# Patient Record
(demographics unavailable — no encounter records)

---

## 2024-10-08 NOTE — HISTORY OF PRESENT ILLNESS
[de-identified] : PERTINENT HISTORY:   CHIEF COMPLAINT:     CONSERVATIVE MANGEMENT: - Medical Management: - Physical Therapy:  - Chiropractic Therapy: - Acupuncture: - Pain Management:     DIAGNOSTIC TESTING REVIEWED:       PHYSICAL EXAM:

## 2024-10-08 NOTE — HISTORY OF PRESENT ILLNESS
[de-identified] : PERTINENT HISTORY:   CHIEF COMPLAINT:     CONSERVATIVE MANGEMENT: - Medical Management: - Physical Therapy:  - Chiropractic Therapy: - Acupuncture: - Pain Management:     DIAGNOSTIC TESTING REVIEWED:       PHYSICAL EXAM:

## 2024-10-08 NOTE — HISTORY OF PRESENT ILLNESS
[de-identified] : PERTINENT HISTORY:   CHIEF COMPLAINT:     CONSERVATIVE MANGEMENT: - Medical Management: - Physical Therapy:  - Chiropractic Therapy: - Acupuncture: - Pain Management:     DIAGNOSTIC TESTING REVIEWED:       PHYSICAL EXAM:

## 2024-10-09 NOTE — ASSESSMENT
[FreeTextEntry1] : Mr. AUGUSTINE presents today for evaluation of lower back pain.  He will proceed with the following.  -Physical therapy 2-3 times a week for 6 to 8 weeks.  -X-rays of the lumbar spine AP lateral views.  -Smoking cessation recommended.  -OTC NSAIDs as needed pain.  I will see him back in 8 weeks for reassessment.  If no improvement conservative management and an MRI of the lumbar spine will be ordered.  Patient is agreeable with our plan of care.  All questions answered today.  Lisa Humphreys MS PA-C Senior Physician Assistant [facility]  Patel Machado MD FAANS Director, Complex & Adult Spinal Deformity Surgery [facility]

## 2024-10-09 NOTE — HISTORY OF PRESENT ILLNESS
[de-identified] : CHIEF COMPLAINT: Mr. AUGUSTINE presents today for evaluation of lower back pain.  No radiculopathy.  He is at similar symptoms in the past which resolved spontaneously.  No bowel bladder dysfunction.   CONSERVATIVE MANGEMENT: None.   DIAGNOSTIC TESTING REVIEWED: No imaging to review today.  PHYSICAL EXAM:  Constitutional: Well appearing, no distress HEENT: Normocephalic Atraumatic Psychiatric: Alert and oriented to all spheres, normal mood Pulmonary: No respiratory distress Neurologic:  CN II-XII grossly intact Palpation: Mild lumbar paraspinal tenderness. Strength: Full strength in all major muscle groups Sensation: Full sensation to light touch in all extremities Reflexes:   2+ patellar  2+ ankle jerk Good range of motion of the lumbar spine with minimal pain. Signs: SLR negative Gait: steady, walking w/o assistance.

## 2024-12-19 NOTE — ASSESSMENT
[FreeTextEntry1] : Mr. AUGUSTINE will proceed with the following.  - Physical therapy 2-3 times a week for 6 to 8 weeks.  - Continue to work on smoking cessation. He has cut down since last visit.   - OTC NSAIDs as needed pain.  I will see him back in 3 months for reassessment.  If no improvement conservative management and an MRI of the lumbar spine will be ordered.  Patient is agreeable with our plan of care.  All questions answered today.  Lisa Humphreys MS PA-C Senior Physician Assistant [facility]  Patel Machado MD FAANS Director, Complex & Adult Spinal Deformity Surgery [facility]

## 2024-12-19 NOTE — HISTORY OF PRESENT ILLNESS
[de-identified] : CHIEF COMPLAINT: Mr. AUGUSTINE continues to experience left sided back pain. No radiculopathy.  He is at similar symptoms in the past which resolved spontaneously.  No bowel bladder dysfunction.   CONSERVATIVE MANGEMENT: None.   DIAGNOSTIC TESTING REVIEWED:  - Xrays lumbar spine reviewed from . Mild degenerative changes L5/S1.   PHYSICAL EXAM:  Constitutional: Well appearing, no distress HEENT: Normocephalic Atraumatic Psychiatric: Alert and oriented to all spheres, normal mood Pulmonary: No respiratory distress Neurologic:  CN II-XII grossly intact Palpation: Mild lumbar paraspinal tenderness. Strength: Full strength in all major muscle groups Sensation: Full sensation to light touch in all extremities Reflexes:   2+ patellar  2+ ankle jerk Good range of motion of the lumbar spine with minimal pain. Signs: SLR negative Gait: steady, walking w/o assistance.